# Patient Record
Sex: FEMALE | Race: WHITE | Employment: FULL TIME | URBAN - METROPOLITAN AREA
[De-identification: names, ages, dates, MRNs, and addresses within clinical notes are randomized per-mention and may not be internally consistent; named-entity substitution may affect disease eponyms.]

---

## 2022-04-27 ENCOUNTER — APPOINTMENT (OUTPATIENT)
Dept: ULTRASOUND IMAGING | Age: 32
End: 2022-04-27
Payer: COMMERCIAL

## 2022-04-27 ENCOUNTER — HOSPITAL ENCOUNTER (EMERGENCY)
Age: 32
Discharge: HOME | End: 2022-04-27
Attending: EMERGENCY MEDICINE
Payer: COMMERCIAL

## 2022-04-27 VITALS
WEIGHT: 178 LBS | DIASTOLIC BLOOD PRESSURE: 98 MMHG | RESPIRATION RATE: 16 BRPM | BODY MASS INDEX: 31.54 KG/M2 | OXYGEN SATURATION: 98 % | HEART RATE: 92 BPM | TEMPERATURE: 97 F | HEIGHT: 63 IN | SYSTOLIC BLOOD PRESSURE: 125 MMHG

## 2022-04-27 DIAGNOSIS — N20.0 KIDNEY STONE: Primary | ICD-10-CM

## 2022-04-27 LAB
ALBUMIN SERPL-MCNC: 4.9 G/DL (ref 3.5–5.2)
ALP BLD-CCNC: 66 U/L (ref 35–104)
ALT SERPL-CCNC: 10 U/L (ref 0–32)
ANION GAP SERPL CALCULATED.3IONS-SCNC: 11 MMOL/L (ref 7–16)
AST SERPL-CCNC: 16 U/L (ref 0–31)
BACTERIA: ABNORMAL /HPF
BASOPHILS ABSOLUTE: 0.04 E9/L (ref 0–0.2)
BASOPHILS RELATIVE PERCENT: 0.4 % (ref 0–2)
BILIRUB SERPL-MCNC: 0.3 MG/DL (ref 0–1.2)
BILIRUBIN URINE: ABNORMAL
BLOOD, URINE: ABNORMAL
BUN BLDV-MCNC: 12 MG/DL (ref 6–20)
CALCIUM SERPL-MCNC: 9.7 MG/DL (ref 8.6–10.2)
CHLORIDE BLD-SCNC: 100 MMOL/L (ref 98–107)
CLARITY: CLEAR
CO2: 28 MMOL/L (ref 22–29)
COLOR: YELLOW
CREAT SERPL-MCNC: 0.7 MG/DL (ref 0.5–1)
EOSINOPHILS ABSOLUTE: 0.01 E9/L (ref 0.05–0.5)
EOSINOPHILS RELATIVE PERCENT: 0.1 % (ref 0–6)
EPITHELIAL CELLS, UA: ABNORMAL /HPF
GFR AFRICAN AMERICAN: >60
GFR NON-AFRICAN AMERICAN: >60 ML/MIN/1.73
GLUCOSE BLD-MCNC: 97 MG/DL (ref 74–99)
GLUCOSE URINE: NEGATIVE MG/DL
HCG, URINE, POC: NEGATIVE
HCT VFR BLD CALC: 39.8 % (ref 34–48)
HEMOGLOBIN: 13.5 G/DL (ref 11.5–15.5)
IMMATURE GRANULOCYTES #: 0.02 E9/L
IMMATURE GRANULOCYTES %: 0.2 % (ref 0–5)
KETONES, URINE: 15 MG/DL
LACTIC ACID, SEPSIS: 0.9 MMOL/L (ref 0.5–1.9)
LEUKOCYTE ESTERASE, URINE: ABNORMAL
LIPASE: 66 U/L (ref 13–60)
LYMPHOCYTES ABSOLUTE: 1.58 E9/L (ref 1.5–4)
LYMPHOCYTES RELATIVE PERCENT: 17.7 % (ref 20–42)
Lab: NORMAL
MCH RBC QN AUTO: 32.4 PG (ref 26–35)
MCHC RBC AUTO-ENTMCNC: 33.9 % (ref 32–34.5)
MCV RBC AUTO: 95.4 FL (ref 80–99.9)
MONOCYTES ABSOLUTE: 0.73 E9/L (ref 0.1–0.95)
MONOCYTES RELATIVE PERCENT: 8.2 % (ref 2–12)
MUCUS: PRESENT /LPF
NEGATIVE QC PASS/FAIL: NORMAL
NEUTROPHILS ABSOLUTE: 6.55 E9/L (ref 1.8–7.3)
NEUTROPHILS RELATIVE PERCENT: 73.4 % (ref 43–80)
NITRITE, URINE: NEGATIVE
PDW BLD-RTO: 11.7 FL (ref 11.5–15)
PH UA: 7 (ref 5–9)
PLATELET # BLD: 277 E9/L (ref 130–450)
PMV BLD AUTO: 8.2 FL (ref 7–12)
POSITIVE QC PASS/FAIL: NORMAL
POTASSIUM SERPL-SCNC: 3.6 MMOL/L (ref 3.5–5)
PROTEIN UA: ABNORMAL MG/DL
RBC # BLD: 4.17 E12/L (ref 3.5–5.5)
RBC UA: ABNORMAL /HPF (ref 0–2)
SODIUM BLD-SCNC: 139 MMOL/L (ref 132–146)
SPECIFIC GRAVITY UA: 1.01 (ref 1–1.03)
TOTAL PROTEIN: 7.9 G/DL (ref 6.4–8.3)
UROBILINOGEN, URINE: 0.2 E.U./DL
WBC # BLD: 8.9 E9/L (ref 4.5–11.5)
WBC UA: ABNORMAL /HPF (ref 0–5)

## 2022-04-27 PROCEDURE — 2580000003 HC RX 258: Performed by: EMERGENCY MEDICINE

## 2022-04-27 PROCEDURE — 85025 COMPLETE CBC W/AUTO DIFF WBC: CPT

## 2022-04-27 PROCEDURE — 6360000002 HC RX W HCPCS: Performed by: EMERGENCY MEDICINE

## 2022-04-27 PROCEDURE — 76705 ECHO EXAM OF ABDOMEN: CPT

## 2022-04-27 PROCEDURE — 83605 ASSAY OF LACTIC ACID: CPT

## 2022-04-27 PROCEDURE — 83690 ASSAY OF LIPASE: CPT

## 2022-04-27 PROCEDURE — 80053 COMPREHEN METABOLIC PANEL: CPT

## 2022-04-27 PROCEDURE — 99284 EMERGENCY DEPT VISIT MOD MDM: CPT

## 2022-04-27 PROCEDURE — 81001 URINALYSIS AUTO W/SCOPE: CPT

## 2022-04-27 PROCEDURE — 96374 THER/PROPH/DIAG INJ IV PUSH: CPT

## 2022-04-27 PROCEDURE — 96375 TX/PRO/DX INJ NEW DRUG ADDON: CPT

## 2022-04-27 PROCEDURE — C9113 INJ PANTOPRAZOLE SODIUM, VIA: HCPCS | Performed by: EMERGENCY MEDICINE

## 2022-04-27 RX ORDER — 0.9 % SODIUM CHLORIDE 0.9 %
1000 INTRAVENOUS SOLUTION INTRAVENOUS ONCE
Status: COMPLETED | OUTPATIENT
Start: 2022-04-27 | End: 2022-04-27

## 2022-04-27 RX ORDER — ONDANSETRON 4 MG/1
4 TABLET, ORALLY DISINTEGRATING ORAL EVERY 8 HOURS PRN
Qty: 9 TABLET | Refills: 0 | Status: SHIPPED | OUTPATIENT
Start: 2022-04-27 | End: 2022-04-30

## 2022-04-27 RX ORDER — PANTOPRAZOLE SODIUM 40 MG/10ML
40 INJECTION, POWDER, LYOPHILIZED, FOR SOLUTION INTRAVENOUS ONCE
Status: COMPLETED | OUTPATIENT
Start: 2022-04-27 | End: 2022-04-27

## 2022-04-27 RX ORDER — ONDANSETRON 2 MG/ML
4 INJECTION INTRAMUSCULAR; INTRAVENOUS ONCE
Status: COMPLETED | OUTPATIENT
Start: 2022-04-27 | End: 2022-04-27

## 2022-04-27 RX ORDER — OXYCODONE HYDROCHLORIDE AND ACETAMINOPHEN 5; 325 MG/1; MG/1
1 TABLET ORAL EVERY 8 HOURS PRN
Qty: 6 TABLET | Refills: 0 | Status: SHIPPED | OUTPATIENT
Start: 2022-04-27 | End: 2022-04-29

## 2022-04-27 RX ORDER — KETOROLAC TROMETHAMINE 30 MG/ML
15 INJECTION, SOLUTION INTRAMUSCULAR; INTRAVENOUS ONCE
Status: COMPLETED | OUTPATIENT
Start: 2022-04-27 | End: 2022-04-27

## 2022-04-27 RX ADMIN — KETOROLAC TROMETHAMINE 15 MG: 30 INJECTION, SOLUTION INTRAMUSCULAR at 19:15

## 2022-04-27 RX ADMIN — ONDANSETRON 4 MG: 2 INJECTION INTRAMUSCULAR; INTRAVENOUS at 18:21

## 2022-04-27 RX ADMIN — PANTOPRAZOLE SODIUM 40 MG: 40 INJECTION, POWDER, FOR SOLUTION INTRAVENOUS at 18:21

## 2022-04-27 RX ADMIN — SODIUM CHLORIDE 1000 ML: 9 INJECTION, SOLUTION INTRAVENOUS at 18:20

## 2022-04-27 ASSESSMENT — PAIN DESCRIPTION - LOCATION: LOCATION: ABDOMEN

## 2022-04-27 ASSESSMENT — PAIN - FUNCTIONAL ASSESSMENT: PAIN_FUNCTIONAL_ASSESSMENT: 0-10

## 2022-04-27 NOTE — ED PROVIDER NOTES
FIRST PROVIDER CONTACT ASSESSMENT NOTE           Department of Emergency Medicine                 First Provider Note            22  4:05 PM EDT    Date of Encounter: No admission date for patient encounter. Patient Name: Xiomara Bailey  : 1990  MRN: [id number]    Chief Complaint: Abdominal Pain (since thursday) and Emesis      History of Present Illness:   Xiomara Bailey is a 27 y.o. female who presents to the ED for N/V and right sided abdominal pain. Intermittent for few days. Urinary frequency. Does not believe she is pregnant. Has both her gallbladder and appendix. Pain slightly better at this time. Focused Physical Exam:  VS:    ED Triage Vitals   BP Temp Temp src Pulse Resp SpO2 Height Weight   22 1603 22 1603 -- 22 1556 22 1556 22 1556 22 1556 22 1556   (!) 125/98 97 °F (36.1 °C)  78 16 100 % 5' 3\" (1.6 m) 178 lb (80.7 kg)        Physical Ex: Constitutional: Alert and non-toxic. Medical History:  has a past medical history of Hypertension. Surgical History:  has no past surgical history on file. Social History:  reports that she has never smoked. She does not have any smokeless tobacco history on file. She reports current drug use. Drug: Marijuana Kassandra Dasen). She reports that she does not drink alcohol. Family History: family history is not on file.     Allergies: Amoxicillin and Penicillins     Initial Plan of Care: Initiate Treatment-Testing, Proceed toTreatment Area When Bed Available for ED Attending/MLP to Continue Care      ---END OF FIRST PROVIDER CONTACT ASSESSMENT NOTE---  Electronically signed by Crow Rod PA-C   DD: 22       Crow Rod PA-C  22 1605

## 2022-04-27 NOTE — ED PROVIDER NOTES
HPI:  4/27/22,   Time: 5:40 PM EDT       Good Pedroza is a 27 y.o. female presenting to the ED for right upper quadrant abdominal pain, beginning 1 week ago. The complaint has been intermittent, moderate in severity, and worsened by eating. Patient states has had intermittent intermittent epigastric and upper quadrant pain is been worsening for the past week she has occasional nausea vomiting. She states certain foods seem to make it worse she ate pepperoni and \"chips dip\" yesterday afternoon that caused her pain in that right upper quadrant she had nausea and vomiting. No back pain no chest pain no shortness of breath no fevers or chills. She states the pain is subsided today but she has not been eating as much. No vomiting today. No urinary complaints. No diarrhea. Review of Systems:   Pertinent positives and negatives are stated within HPI, all other systems reviewed and are negative.          --------------------------------------------- PAST HISTORY ---------------------------------------------  Past Medical History:  has a past medical history of Hypertension. Past Surgical History:  has no past surgical history on file. Social History:  reports that she has never smoked. She does not have any smokeless tobacco history on file. She reports current drug use. Drug: Marijuana Rina Avila). She reports that she does not drink alcohol. Family History: family history is not on file. The patients home medications have been reviewed.     Allergies: Amoxicillin and Penicillins        ---------------------------------------------------PHYSICAL EXAM--------------------------------------    Constitutional/General: Alert and oriented x3, well appearing, non toxic in NAD  Head: Normocephalic and atraumatic  Eyes: PERRL, EOMI, conjunctive normal, sclera non icteric  Mouth: Oropharynx clear, handling secretions, no trismus, no asymmetry of the posterior oropharynx or uvular edema  Neck: Supple, full ROM, non tender to palpation in the midline, no stridor, no crepitus, no meningeal signs  Respiratory: Lungs clear to auscultation bilaterally, no wheezes, rales, or rhonchi. Not in respiratory distress  Cardiovascular:  Regular rate. Regular rhythm. No murmurs, gallops, or rubs. 2+ distal pulses  Chest: No chest wall tenderness  GI:  Abdomen Soft, mild tenderness right upper quadrant. No Morris sign. No McBurney's point tenderness. Non distended. +BS. No organomegaly, no palpable masses,  No rebound, guarding, or rigidity. No CVA tenderness bilaterally  Musculoskeletal: Moves all extremities x 4. Warm and well perfused, no clubbing, cyanosis, or edema. Capillary refill <3 seconds  Integument: skin warm and dry. No rashes. Lymphatic: no lymphadenopathy noted  Neurologic: GCS 15, no focal deficits, symmetric strength 5/5 in the upper and lower extremities bilaterally  Psychiatric: Normal Affect    -------------------------------------------------- RESULTS -------------------------------------------------  I have personally reviewed all laboratory and imaging results for this patient. Results are listed below.      LABS:  Results for orders placed or performed during the hospital encounter of 04/27/22   Comprehensive Metabolic Panel   Result Value Ref Range    Sodium 139 132 - 146 mmol/L    Potassium 3.6 3.5 - 5.0 mmol/L    Chloride 100 98 - 107 mmol/L    CO2 28 22 - 29 mmol/L    Anion Gap 11 7 - 16 mmol/L    Glucose 97 74 - 99 mg/dL    BUN 12 6 - 20 mg/dL    CREATININE 0.7 0.5 - 1.0 mg/dL    GFR Non-African American >60 >=60 mL/min/1.73    GFR African American >60     Calcium 9.7 8.6 - 10.2 mg/dL    Total Protein 7.9 6.4 - 8.3 g/dL    Albumin 4.9 3.5 - 5.2 g/dL    Total Bilirubin 0.3 0.0 - 1.2 mg/dL    Alkaline Phosphatase 66 35 - 104 U/L    ALT 10 0 - 32 U/L    AST 16 0 - 31 U/L   CBC with Auto Differential   Result Value Ref Range    WBC 8.9 4.5 - 11.5 E9/L    RBC 4.17 3.50 - 5.50 E12/L    Hemoglobin 13.5 11.5 - 15.5 g/dL    Hematocrit 39.8 34.0 - 48.0 %    MCV 95.4 80.0 - 99.9 fL    MCH 32.4 26.0 - 35.0 pg    MCHC 33.9 32.0 - 34.5 %    RDW 11.7 11.5 - 15.0 fL    Platelets 401 422 - 813 E9/L    MPV 8.2 7.0 - 12.0 fL    Neutrophils % 73.4 43.0 - 80.0 %    Immature Granulocytes % 0.2 0.0 - 5.0 %    Lymphocytes % 17.7 (L) 20.0 - 42.0 %    Monocytes % 8.2 2.0 - 12.0 %    Eosinophils % 0.1 0.0 - 6.0 %    Basophils % 0.4 0.0 - 2.0 %    Neutrophils Absolute 6.55 1.80 - 7.30 E9/L    Immature Granulocytes # 0.02 E9/L    Lymphocytes Absolute 1.58 1.50 - 4.00 E9/L    Monocytes Absolute 0.73 0.10 - 0.95 E9/L    Eosinophils Absolute 0.01 (L) 0.05 - 0.50 E9/L    Basophils Absolute 0.04 0.00 - 0.20 E9/L   Lipase   Result Value Ref Range    Lipase 66 (H) 13 - 60 U/L   Urinalysis   Result Value Ref Range    Color, UA Yellow Straw/Yellow    Clarity, UA Clear Clear    Glucose, Ur Negative Negative mg/dL    Bilirubin Urine SMALL (A) Negative    Ketones, Urine 15 (A) Negative mg/dL    Specific Gravity, UA 1.015 1.005 - 1.030    Blood, Urine LARGE (A) Negative    pH, UA 7.0 5.0 - 9.0    Protein, UA TRACE Negative mg/dL    Urobilinogen, Urine 0.2 <2.0 E.U./dL    Nitrite, Urine Negative Negative    Leukocyte Esterase, Urine TRACE (A) Negative   Lactate, Sepsis   Result Value Ref Range    Lactic Acid, Sepsis 0.9 0.5 - 1.9 mmol/L   Microscopic Urinalysis   Result Value Ref Range    Mucus, UA Present (A) None Seen /LPF    WBC, UA 1-3 0 - 5 /HPF    RBC, UA 10-20 (A) 0 - 2 /HPF    Epithelial Cells, UA FEW /HPF    Bacteria, UA FEW (A) None Seen /HPF   POC Pregnancy Urine Qual   Result Value Ref Range    HCG, Urine, POC Negative Negative    Lot Number [id number]     Positive QC Pass/Fail Pass     Negative QC Pass/Fail Pass        RADIOLOGY:  Interpreted by Radiologist.  US ABDOMEN LIMITED   Final Result   4 mm right UVJ calculus with mild right hydroureteronephrosis. Sonographically normal gallbladder and common bile duct.       Mild echogenic right renal cortex.                 ------------------------- NURSING NOTES AND VITALS REVIEWED ---------------------------   The nursing notes within the ED encounter and vital signs as below have been reviewed by myself. BP (!) 125/98   Pulse 92   Temp 97 °F (36.1 °C)   Resp 16   Ht 5' 3\" (1.6 m)   Wt 178 lb (80.7 kg)   SpO2 98%   BMI 31.53 kg/m²   Oxygen Saturation Interpretation: Normal    The patients available past medical records and past encounters were reviewed. ------------------------------ ED COURSE/MEDICAL DECISION MAKING----------------------  Medications   0.9 % sodium chloride bolus (0 mLs IntraVENous Stopped 4/27/22 1850)   ondansetron (ZOFRAN) injection 4 mg (4 mg IntraVENous Given 4/27/22 1821)   pantoprazole (PROTONIX) injection 40 mg (40 mg IntraVENous Given 4/27/22 1821)   ketorolac (TORADOL) injection 15 mg (15 mg IntraVENous Given 4/27/22 1915)         ED COURSE:  ED Course as of 04/29/22 1029   Wed Apr 27, 2022 1948 Patient reassessed she is feeling better pain is well controlled. Kidney stone on CT. She is given pain and nausea medication and close urology follow-up she is comfortable plan for discharge. Repeat abdominal exam is soft and nontender. No Morris sign. No CVA tenderness. [KK]   1948 Monocytes Absolute: 0.73 [KK]      ED Course User Index  [KK] Marnee Cowden, MD       Medical Decision Making:    Patient is a pleasant 79-year-old female with right upper quadrant abdominal pain worse after eating certain fatty foods. Occasional nausea vomiting intermittent for the past week. No fevers or chills. Pain is improving at this time only a 2 out of 10. Patient given nausea medication does not want pain medication at this time.   We will do right upper quadrant ultrasound lab work    Differential diagnoses gallstone biliary colic acute cholecystitis GERD gastritis peptic ulcer disease nausea vomiting        This patient has remained hemodynamically stable during their ED course. Patient was initially given GI medications and then Toradol. Urine pregnancy was negative. CBC was normal.  White count was 8.9 chemistry normal LFTs were normal.  Lipase was borderline normal.  No significant elevation. Urinalysis was negative for acute infection but did show large blood. Lactic was normal.  Patient had an ultrasound evaluate gallbladder showed a normal gallbladder. No signs of cholecystitis or gallstones but they did show a 4 mm right UVJ stone in the right ureter. Patient was given Toradol complete resolution of her pain. Should be sent home with a short prescription for Percocet as well as Zofran. Looked up in the PDMP I see no red flags to doing so. She will follow-up with her PCP as well as urology. Repeat abdominal exam is soft nontender. Able tolerate p.o. fluids stable for discharge. Re-Evaluations:             Re-evaluation. Patients symptoms are improving    Re-examination  4/27/22   5:40 PM EDT          Vital Signs:   Vitals:    04/27/22 1556 04/27/22 1603   BP:  (!) 125/98   Pulse: 78 92   Resp: 16 16   Temp:  97 °F (36.1 °C)   SpO2: 100% 98%   Weight: 178 lb (80.7 kg)    Height: 5' 3\" (1.6 m)            Counseling: The emergency provider has spoken with the patient and discussed todays results, in addition to providing specific details for the plan of care and counseling regarding the diagnosis and prognosis. Questions are answered at this time and they are agreeable with the plan.       --------------------------------- IMPRESSION AND DISPOSITION ---------------------------------    IMPRESSION  1. Kidney stone        DISPOSITION  Disposition: Discharge to home  Patient condition is stable    NOTE: This report was transcribed using voice recognition software.  Every effort was made to ensure accuracy; however, inadvertent computerized transcription errors may be present        Germania Polanco MD  04/29/22 0225